# Patient Record
Sex: MALE | Race: WHITE | NOT HISPANIC OR LATINO
[De-identification: names, ages, dates, MRNs, and addresses within clinical notes are randomized per-mention and may not be internally consistent; named-entity substitution may affect disease eponyms.]

---

## 2022-01-27 PROBLEM — Z00.00 ENCOUNTER FOR PREVENTIVE HEALTH EXAMINATION: Status: ACTIVE | Noted: 2022-01-27

## 2023-01-30 ENCOUNTER — APPOINTMENT (OUTPATIENT)
Dept: ORTHOPEDIC SURGERY | Facility: CLINIC | Age: 38
End: 2023-01-30

## 2023-02-13 ENCOUNTER — NON-APPOINTMENT (OUTPATIENT)
Age: 38
End: 2023-02-13

## 2023-02-13 ENCOUNTER — APPOINTMENT (OUTPATIENT)
Dept: ORTHOPEDIC SURGERY | Facility: CLINIC | Age: 38
End: 2023-02-13
Payer: COMMERCIAL

## 2023-02-13 VITALS — HEIGHT: 68 IN | WEIGHT: 160 LBS | BODY MASS INDEX: 24.25 KG/M2

## 2023-02-13 DIAGNOSIS — M94.0 CHONDROCOSTAL JUNCTION SYNDROME [TIETZE]: ICD-10-CM

## 2023-02-13 DIAGNOSIS — M75.22 BICIPITAL TENDINITIS, LEFT SHOULDER: ICD-10-CM

## 2023-02-13 PROCEDURE — 73030 X-RAY EXAM OF SHOULDER: CPT | Mod: LT

## 2023-02-13 PROCEDURE — 99203 OFFICE O/P NEW LOW 30 MIN: CPT

## 2023-02-14 PROBLEM — M75.22 BICEPS TENDINITIS OF LEFT UPPER EXTREMITY: Status: ACTIVE | Noted: 2023-02-14

## 2023-02-14 PROBLEM — M94.0 TIETZE SYNDROME: Status: ACTIVE | Noted: 2023-02-14

## 2023-02-14 NOTE — ASSESSMENT
[FreeTextEntry1] : Discussed at length with patient exam history as well as imaging is reported imaging of the negative MRI of his chest.  Reviewed with the symptoms reverse the shoulder consistent with biceps tendinitis possible labral tear treatment options reviewed this time patient elects home exercises.  Offered cortisone injection but declined.  In regards to the anterior chest symptoms discussed possible costochondritis as well as his symptoms maybe related to reflux.  Recommended him to follow up with his primary care doctor is reflux sometimes can present with similar symptoms

## 2023-02-14 NOTE — HISTORY OF PRESENT ILLNESS
[de-identified] : location: LEFT shoulder/pectoral muscle \par duration: since approx feb 2020\par context: intermittent pain beginning in L pectoral muscle, now c/o discomfort in L shoulder; cardiac w/u negative. pt states that it seems like muscular discomfort \par quality: discomfort\par aggravating factors: N/A\par associated sx: intermittent pain and tingling radiating down L arm\par conservative tx: N/A\par prior studies: cardiac w/u negative

## 2023-02-14 NOTE — PHYSICAL EXAM
[de-identified] : Left Shoulder:\par Constitutional:\par The patient is healthy-appearing and in no apparent distress. \par \par Cardiovascular System: \par The capillary refill is less than 2 seconds. \par \par Skin: \par There are no skin abnormalities.\par \par C-Spine/Neck:\par \par Active Range of Motion:\par Flexion				50\par Extension			60\par Lateral rotation			80  \par \par Left Shoulder: \par Inspection: \par There is no atrophy, erythema, warmth, swelling.\par There is no scapular winging.\par There is no AC prominence. \par \par Bony Palpation: \par There is no tenderness of the clavicle.\par There is no tenderness of the acromioclavicular joint.\par There is no tenderness of the greater tuberosity. \par There is tenderness of the bicipital groove.\par  \par Soft Tissue Palpation: \par There is no tenderness of the trapezius.\par There is no tenderness of the rhomboid.\par There is no tenderness of the subacromial bursa. \par \par Active Range of Motion: \par Forward flexion- 				180 \par Abduction-					150\par External rotation at 0 degrees abduction-	80 \par Internal rotation at 0 degrees abduction-	80\par \par Passive Range of Motion: \par Forward flexion- 			180 \par Abduction-				150\par External rotation at 0 deg abduction-	80 \par Internal rotation at 0 deg abduction-	80\par \par Strength:\par Supraspinatus / Abduction                  5/5\par External rotation                                 5/5\par Internal rotation                                   5/5\par \par Special Tests: \par Hawkin's  				Positive \par Speed's  				Negative\par AC cross-over 			            Negative\par [place]'s  				Positive\par \par Neurological System: \par \par There is normal sensation to light touch C5-T1. \par \par Stability: \par There is no general laxity. \par \par Psychiatric: \par The patient demonstrates a normal mood and affect and is active and alert\par \par Chest wall:\par There is mild tenderness of the left lower costochondral sternal junction.\par  [de-identified] : Given patient's reported history and physical examination, x-ray evaluation ( as listed below ) was ordered and performed to aid in diagnosis and treatment of the patient.\par X-ray left shoulder.  There is no significant bony / soft tissue abnormality, arthritis, or fracture.

## 2024-09-25 ENCOUNTER — APPOINTMENT (OUTPATIENT)
Dept: ORTHOPEDIC SURGERY | Facility: CLINIC | Age: 39
End: 2024-09-25
Payer: COMMERCIAL

## 2024-09-25 VITALS — HEIGHT: 68 IN | BODY MASS INDEX: 24.25 KG/M2 | WEIGHT: 160 LBS

## 2024-09-25 DIAGNOSIS — M54.2 CERVICALGIA: ICD-10-CM

## 2024-09-25 DIAGNOSIS — Z78.9 OTHER SPECIFIED HEALTH STATUS: ICD-10-CM

## 2024-09-25 PROCEDURE — 99214 OFFICE O/P EST MOD 30 MIN: CPT

## 2024-09-25 PROCEDURE — 72050 X-RAY EXAM NECK SPINE 4/5VWS: CPT

## 2024-09-25 RX ORDER — CYCLOBENZAPRINE HYDROCHLORIDE 10 MG/1
10 TABLET, FILM COATED ORAL 3 TIMES DAILY
Qty: 90 | Refills: 1 | Status: ACTIVE | COMMUNITY
Start: 2024-09-25 | End: 1900-01-01

## 2024-09-25 RX ORDER — MELOXICAM 15 MG/1
15 TABLET ORAL
Qty: 30 | Refills: 1 | Status: ACTIVE | COMMUNITY
Start: 2024-09-25 | End: 1900-01-01

## 2024-09-25 NOTE — HISTORY OF PRESENT ILLNESS
[de-identified] : 40 y/o male presenting with left sided neck pain x 1 month. 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 He gets occasional tingling on the back of his neck. He takes tylenol and aspirin. He was seeing a neurologist for headaches 8 months ago and has a cervical MRI from that time. His headaches have resolved.  denies radicular pain, recent illness, fevers, numbness, weakness, balance problems, saddle anesthesia, urinary retention or fecal incontinence.

## 2024-09-25 NOTE — PHYSICAL EXAM
[de-identified] :  General: No acute distress, conversant, well-nourished. Head: Normocephalic, atraumatic Neck: trachea midline, FROM Heart: normotensive and normal rate and rhythm Lungs: No labored breathing Skin: No abrasions, no rashes, no edema Psych: Alert and oriented to person, place and time Extremities: no peripheral edema or digital cyanosis Gait: Normal gait. Can perform tandem gait.  Vascular: warm and well perfused distally, palpable distal pulses   MSK: Cervical Spine: Alignment normal. No tenderness to palpation.  No step-off, no deformity. No pain or neurologic symptoms with full active range of motion (flexion, extension, lateral bending and rotation).   NEURO: Sensation          Left          C5     2/2              C6     2/2              C7     2/2              C8     2/2             T1     2/2                       Right        C5     2/2              C6     2/2              C7     2/2              C8     2/2             T1     2/2       Motor:                                                Left            C5 (deltoid abduction)             5/5              C6 (biceps flexion)                   5/5               C7 (triceps extension)             5/5              C8 (finger flexion)                     5/5              T1 (interosseous)                     5/5                                                           Right          C5 (deltoid abduction)             5/5              C6 (biceps flexion)                   5/5               C7 (triceps extension)             5/5              C8 (finger flexion)                     5/5              T1 (interosseous)                     5/5                      Reflexes: Normal and symmetric   Negative Spurlings test.  Negative Bakodys sign. Negative Hoffmans reflex.  [de-identified] : I ordered radiographs to evaluate the patient's symptoms.  Cervical 4 view radiographs taken in the office today show no dislocation or fracture. Cervical spondylosis.  No instability on dynamic series.  I independently reviewed cervical MRI which shows degenerative changes without compression of neural elements.  MRI CERVICAL SPINE 1/25/2024 [facility]:  IMPRESSION:  MRI of the cervical spine demonstrates:  Multilevel degenerative changes of the cervical spine contributing to mild spinal canal stenosis at C5-6, and minimal ventral cord impingement at C4-5. Mild ventral thecal sac effacement at C3-4.  Moderate to severe right and moderate left foraminal stenosis at C5-6, mild to moderate on the left and mild on the right at C4-5, and mild on the left at C3-4.  Slight degenerative retrolisthesis of C5 on C6.  Normal cervical spinal cord signal.  Thank you for the opportunity to participate in the care of this patient.

## 2024-09-25 NOTE — ASSESSMENT
[FreeTextEntry1] : 40 y/o male presenting with left sided neck pain x 1 month. 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 He gets occasional tingling on the back of his neck. He takes tylenol and aspirin. He was seeing a neurologist for headaches 8 months ago and has a cervical MRI from that time. His headaches have resolved.  denies radicular pain, recent illness, fevers, numbness, weakness, balance problems, saddle anesthesia, urinary retention or fecal incontinence. The patient was given a referral for physical therapy. Start meloxicam. Start cyclobenzaprine. Discussed option for spinal injection if failure to improve with conservative management. F/U in 4-6 weeks. We discussed red flag symptoms that would require emergent evaluation. He knows to call with any questions or concerns or if his symptoms acutely worsen.